# Patient Record
Sex: FEMALE | URBAN - METROPOLITAN AREA
[De-identification: names, ages, dates, MRNs, and addresses within clinical notes are randomized per-mention and may not be internally consistent; named-entity substitution may affect disease eponyms.]

---

## 2017-11-14 ENCOUNTER — IMPORTED ENCOUNTER (OUTPATIENT)
Dept: URBAN - METROPOLITAN AREA CLINIC 38 | Facility: CLINIC | Age: 52
End: 2017-11-14

## 2017-11-14 PROBLEM — H52.13 MYOPIA, BILATERAL: Noted: 2017-11-14

## 2017-11-14 PROCEDURE — 92015 DETERMINE REFRACTIVE STATE: CPT

## 2017-11-14 PROCEDURE — 92004 COMPRE OPH EXAM NEW PT 1/>: CPT

## 2018-05-05 ENCOUNTER — HOSPITAL ENCOUNTER (EMERGENCY)
Facility: HOSPITAL | Age: 53
Discharge: HOME | End: 2018-05-05
Attending: EMERGENCY MEDICINE
Payer: COMMERCIAL

## 2018-05-05 VITALS
OXYGEN SATURATION: 96 % | TEMPERATURE: 97.5 F | DIASTOLIC BLOOD PRESSURE: 74 MMHG | HEART RATE: 62 BPM | RESPIRATION RATE: 20 BRPM | SYSTOLIC BLOOD PRESSURE: 131 MMHG

## 2018-05-05 DIAGNOSIS — R07.9 CHEST PAIN, UNSPECIFIED TYPE: Primary | ICD-10-CM

## 2018-05-05 LAB
ANION GAP SERPL CALC-SCNC: 6 MEQ/L (ref 3–15)
BASOPHILS # BLD: 0.03 K/UL (ref 0.01–0.1)
BASOPHILS NFR BLD: 0.6 %
BUN SERPL-MCNC: 19 MG/DL (ref 8–20)
CALCIUM SERPL-MCNC: 9.2 MG/DL (ref 8.9–10.3)
CHLORIDE SERPL-SCNC: 106 MMOL/L (ref 98–109)
CO2 SERPL-SCNC: 26 MMOL/L (ref 22–32)
CREAT SERPL-MCNC: 0.7 MG/DL (ref 0.6–1.1)
D DIMER PPP IA.FEU-MCNC: <0.27 ÆG/ML (ref 0–0.5)
DIFFERENTIAL METHOD BLD: NORMAL
EOSINOPHIL # BLD: 0.11 K/UL (ref 0.04–0.36)
EOSINOPHIL NFR BLD: 2.2 %
ERYTHROCYTE [DISTWIDTH] IN BLOOD BY AUTOMATED COUNT: 12.2 % (ref 11.7–14.4)
GFR SERPL CREATININE-BSD FRML MDRD: >60 ML/MIN/1.73M*2
GLUCOSE SERPL-MCNC: 84 MG/DL (ref 70–99)
HCG UR QL: NEGATIVE
HCT VFR BLDCO AUTO: 38.9 % (ref 35–45)
HGB BLD-MCNC: 13.2 G/DL (ref 11.8–15.7)
IMM GRANULOCYTES # BLD AUTO: 0.01 K/UL (ref 0–0.08)
IMM GRANULOCYTES NFR BLD AUTO: 0.2 %
LYMPHOCYTES # BLD: 1.88 K/UL (ref 1.2–3.5)
LYMPHOCYTES NFR BLD: 37.5 %
MCH RBC QN AUTO: 30.1 PG (ref 28–33.2)
MCHC RBC AUTO-ENTMCNC: 33.9 G/DL (ref 32.2–35.5)
MCV RBC AUTO: 88.8 FL (ref 83–98)
MONOCYTES # BLD: 0.41 K/UL (ref 0.28–0.8)
MONOCYTES NFR BLD: 8.2 %
NEUTROPHILS # BLD: 2.58 K/UL (ref 1.7–7)
NEUTS SEG NFR BLD: 51.3 %
NRBC BLD-RTO: 0 %
PDW BLD AUTO: 10.8 FL (ref 9.4–12.3)
PLATELET # BLD AUTO: 178 K/UL (ref 150–369)
POTASSIUM SERPL-SCNC: 4 MMOL/L (ref 3.6–5.1)
RBC # BLD AUTO: 4.38 M/UL (ref 3.93–5.22)
SODIUM SERPL-SCNC: 138 MMOL/L (ref 136–144)
TROPONIN I SERPL-MCNC: <0.02 NG/ML
TROPONIN I SERPL-MCNC: <0.02 NG/ML
WBC # BLD AUTO: 5.02 K/UL (ref 3.8–10.5)

## 2018-05-05 PROCEDURE — 84484 ASSAY OF TROPONIN QUANT: CPT | Mod: 91 | Performed by: PHYSICIAN ASSISTANT

## 2018-05-05 PROCEDURE — 85025 COMPLETE CBC W/AUTO DIFF WBC: CPT | Performed by: PHYSICIAN ASSISTANT

## 2018-05-05 PROCEDURE — 99284 EMERGENCY DEPT VISIT MOD MDM: CPT | Mod: 25

## 2018-05-05 PROCEDURE — 63700000 HC SELF-ADMINISTRABLE DRUG: Performed by: EMERGENCY MEDICINE

## 2018-05-05 PROCEDURE — 82374 ASSAY BLOOD CARBON DIOXIDE: CPT | Performed by: PHYSICIAN ASSISTANT

## 2018-05-05 PROCEDURE — 85379 FIBRIN DEGRADATION QUANT: CPT | Performed by: PHYSICIAN ASSISTANT

## 2018-05-05 PROCEDURE — 96360 HYDRATION IV INFUSION INIT: CPT

## 2018-05-05 PROCEDURE — 84703 CHORIONIC GONADOTROPIN ASSAY: CPT | Performed by: PHYSICIAN ASSISTANT

## 2018-05-05 PROCEDURE — 93005 ELECTROCARDIOGRAM TRACING: CPT | Performed by: PHYSICIAN ASSISTANT

## 2018-05-05 PROCEDURE — 25800000 HC PHARMACY IV SOLUTIONS: Performed by: PHYSICIAN ASSISTANT

## 2018-05-05 PROCEDURE — 93005 ELECTROCARDIOGRAM TRACING: CPT | Performed by: EMERGENCY MEDICINE

## 2018-05-05 PROCEDURE — 36415 COLL VENOUS BLD VENIPUNCTURE: CPT | Performed by: PHYSICIAN ASSISTANT

## 2018-05-05 RX ORDER — IBUPROFEN 600 MG/1
600 TABLET ORAL ONCE
Status: COMPLETED | OUTPATIENT
Start: 2018-05-05 | End: 2018-05-05

## 2018-05-05 RX ORDER — ASPIRIN 325 MG
325 TABLET ORAL DAILY
COMMUNITY
End: 2021-10-25

## 2018-05-05 RX ADMIN — SODIUM CHLORIDE 1000 ML: 9 INJECTION, SOLUTION INTRAVENOUS at 14:37

## 2018-05-05 RX ADMIN — IBUPROFEN 600 MG: 600 TABLET ORAL at 17:28

## 2018-05-05 NOTE — ED ATTESTATION NOTE
I have personally seen and examined the patient.  I reviewed and agree with physician assistant / nurse practitioner’s assessment and plan of care, with the following exceptions: None  My examination, assessment, and plan of care of Katie Mcdonald is as follows:     Exam: Well-appearing, no acute distress, regular rate and rhythm, lungs clear to auscultation, normal pulses, no lower extremity edema or calf tenderness to palpation.    Assessment and plan: Patient with constant pleuritic chest pain since 5 AM, labs, EKG, chest x-ray unremarkable, will repeat troponin and if negative, follow-up Monday with cards.     Mumtaz Rasmussen, DO  05/05/18 [id number]

## 2018-05-05 NOTE — DISCHARGE INSTRUCTIONS
Follow up with a cardiologist on Monday morning  Follow up with your PCP   Return to the ED if symptoms worsen

## 2018-05-06 LAB
ATRIAL RATE: 54
ATRIAL RATE: 72
P AXIS: 69
P AXIS: 73
PR INTERVAL: 140
PR INTERVAL: 156
QRS DURATION: 90
QRS DURATION: 94
QT INTERVAL: 408
QT INTERVAL: 410
QTC CALCULATION(BAZETT): 386
QTC CALCULATION(BAZETT): 448
R AXIS: 31
R AXIS: 31
T WAVE AXIS: 30
T WAVE AXIS: 42
VENTRICULAR RATE: 54
VENTRICULAR RATE: 72

## 2018-10-24 ENCOUNTER — IMPORTED ENCOUNTER (OUTPATIENT)
Dept: URBAN - METROPOLITAN AREA CLINIC 38 | Facility: CLINIC | Age: 53
End: 2018-10-24

## 2018-10-24 PROBLEM — H53.9 VISUAL DISTURBANCE - UNSPECIFIED: Noted: 2018-10-24

## 2018-10-24 PROBLEM — H52.13 MYOPIA, BILATERAL: Noted: 2018-10-24

## 2018-10-24 PROCEDURE — 92015 DETERMINE REFRACTIVE STATE: CPT

## 2018-11-28 ENCOUNTER — IMPORTED ENCOUNTER (OUTPATIENT)
Dept: URBAN - METROPOLITAN AREA CLINIC 38 | Facility: CLINIC | Age: 53
End: 2018-11-28

## 2018-11-28 PROBLEM — H43.812 VITREOUS DETACHMENT OF LT EYE: Noted: 2018-11-28

## 2018-11-28 PROBLEM — H53.9 VISUAL DISTURBANCE - UNSPECIFIED: Noted: 2018-11-28

## 2018-11-28 PROCEDURE — 92014 COMPRE OPH EXAM EST PT 1/>: CPT

## 2019-10-23 ENCOUNTER — IMPORTED ENCOUNTER (OUTPATIENT)
Dept: URBAN - METROPOLITAN AREA CLINIC 38 | Facility: CLINIC | Age: 54
End: 2019-10-23

## 2019-10-23 PROBLEM — H52.13 MYOPIA, BILATERAL: Noted: 2019-10-23

## 2019-10-23 PROBLEM — H25.13 CATARACT (AGE RELATED) - NS (NUCLEAR) - OU: Noted: 2019-10-23

## 2019-10-23 PROCEDURE — 92015 DETERMINE REFRACTIVE STATE: CPT

## 2021-10-25 ENCOUNTER — OFFICE VISIT (OUTPATIENT)
Dept: CARDIOLOGY | Facility: CLINIC | Age: 56
End: 2021-10-25
Payer: COMMERCIAL

## 2021-10-25 ENCOUNTER — TELEPHONE (OUTPATIENT)
Dept: CARDIOLOGY | Facility: CLINIC | Age: 56
End: 2021-10-25

## 2021-10-25 VITALS
WEIGHT: 153 LBS | SYSTOLIC BLOOD PRESSURE: 120 MMHG | HEIGHT: 66 IN | HEART RATE: 71 BPM | DIASTOLIC BLOOD PRESSURE: 78 MMHG | BODY MASS INDEX: 24.59 KG/M2 | OXYGEN SATURATION: 98 %

## 2021-10-25 DIAGNOSIS — R07.9 CHEST PAIN, UNSPECIFIED TYPE: Primary | ICD-10-CM

## 2021-10-25 DIAGNOSIS — R06.02 SHORTNESS OF BREATH: ICD-10-CM

## 2021-10-25 DIAGNOSIS — I34.0 NONRHEUMATIC MITRAL VALVE REGURGITATION: ICD-10-CM

## 2021-10-25 PROCEDURE — 93000 ELECTROCARDIOGRAM COMPLETE: CPT | Performed by: INTERNAL MEDICINE

## 2021-10-25 PROCEDURE — 3008F BODY MASS INDEX DOCD: CPT | Performed by: INTERNAL MEDICINE

## 2021-10-25 PROCEDURE — 99214 OFFICE O/P EST MOD 30 MIN: CPT | Performed by: INTERNAL MEDICINE

## 2021-10-25 NOTE — TELEPHONE ENCOUNTER
Name of patient: Katie Mcdonald, [birth year]         Name of physician: MOISÉS Vidal         Type of test: TTE         Insurance: [facility]         Location having test done: [facility]         NPI of location: [id number]         Scheduled/Expected date for test: 11/16/21

## 2021-10-25 NOTE — PROGRESS NOTES
"   [facility] Cardiology  [place] Office Initial Visit       Patient ID: Katie Mcdonald 56 y.o. female 1965  PCP: Maria Miles MD      HPI     Katie Mcdonald is a 56 y.o. female .    She has a background of atypical chest pain in 2018 that necessitated a visit to the [facility] emergency room. She is a family history of premature atherosclerotic heart disease in her mother. She had a stress echo performed in our office in May 2018 which revealed good exercise tolerance PACs with exercise but no electrocardiographic or echocardiographic ischemia. Her resting left ventricular function was normal at that time as well.    She complains of nonspecific symptoms in her upper chest and neck that are not necessarily exertional.  It does happen sometimes when she is gardening but it involves bending over and standing up a lot with inadvertent Valsalva maneuvers.  She denies symptoms suggestive of angina.       Past History      Past Medical History:   Diagnosis Date   • Leaky heart valve 2018   • Shortness of breath        History reviewed. No pertinent surgical history.    Social History     Social History Narrative   • Not on file       Family History   Problem Relation Age of Onset   • Hypertension Biological Mother    • Hypertension Biological Father         Medications     Current Outpatient Medications   Medication Instructions   • aspirin 325 mg, oral, Daily         Allergies     Antihistimine and Super bees     Vital Signs/Exam     Vitals:    10/25/21 1039   BP: 120/78   Pulse: 71   SpO2: 98%   Weight: 69.4 kg (153 lb)   Height: 1.676 m (5' 6\")     BP Readings from Last 3 Encounters:   10/25/21 120/78   05/05/18 131/74     Wt Readings from Last 3 Encounters:   10/25/21 69.4 kg (153 lb)        Gen: no distress  HEENT: no jvd  Lungs: clear bilaterally; no crackles, rhonchi, wheezing  Heart: regular rate and rhythm; no murmur  Extremities: no edema  Neuro: nonfocal, alert and " oriented  Psych: normal mood and affect     Diagnostic Data   Diagnostic data personally reviewed. Available medical records were reviewed and updated where appropriate including laboratory data, imaging studies, and previous outpatient and inpatient records.  Counseling/education performed.     Labs:  Lab Results   Component Value Date    WBC 5.02 05/05/2018    HGB 13.2 05/05/2018     05/05/2018     05/05/2018    K 4.0 05/05/2018     05/05/2018    CREATININE 0.7 05/05/2018    BUN 19 05/05/2018    CO2 26 05/05/2018     No results found for: CHOL, HDL  No results found for: LDLCALC    Echocardiogram:      Stress Tests:             Vascular Studies:  No results found for this or any previous visit from the past 365 days.    No results found for this or any previous visit from the past 365 days.    No results found for this or any previous visit from the past 365 days.    No results found for this or any previous visit from the past 365 days.      Cardiac cath:      Peripheral:  No results found for this or any previous visit from the past 365 days.         Assessment and Plan      Katie Mcdonald is a 56 y.o. female who presents today for initial office evaluation.       Diagnosis Plan   1. Chest pain, unspecified type     2. Nonrheumatic mitral valve regurgitation     3. Shortness of breath  ECG 12 LEAD-OFFICE PERFORMED       With regards to her symptoms while gardening I think they are nonspecific and noncardiac.  She has mild mitral insufficiency which is nonrheumatic and I suspect is almost physiologic but has been 3 years since her last echocardiogram and I am going to repeat it.  In the meantime there are certainly no restrictions on her activities.  I do not see the need to repeat her stress test.             James Vidal M.D., F.A.C.C.      [facility] Office: [phone]  [facility]: [facility]     10/25/2021

## 2021-11-16 ENCOUNTER — HOSPITAL ENCOUNTER (OUTPATIENT)
Dept: CARDIOLOGY | Facility: CLINIC | Age: 56
Discharge: HOME | End: 2021-11-16
Payer: COMMERCIAL

## 2021-11-16 VITALS — BODY MASS INDEX: 24.59 KG/M2 | HEIGHT: 66 IN | WEIGHT: 153 LBS

## 2021-11-16 DIAGNOSIS — I34.0 NONRHEUMATIC MITRAL VALVE REGURGITATION: ICD-10-CM

## 2021-11-16 DIAGNOSIS — R06.02 SHORTNESS OF BREATH: ICD-10-CM

## 2021-11-16 DIAGNOSIS — R07.9 CHEST PAIN, UNSPECIFIED TYPE: ICD-10-CM

## 2021-11-16 LAB
AORTIC ROOT ANNULUS: 2.9 CM
AORTIC VALVE AREA: 1.22 SQUARE CENTIMETERS PER SQUARE METER
ASCENDING AORTA: 2.73 CM
AV PEAK GRADIENT: 6.1 MMHG
AV PEAK VELOCITY-S: 1.23 M/S
BSA FOR ECHO PROCEDURE: 1.8 M2
E WAVE DECELERATION TIME: 133.26 MS
E/A RATIO: 1.09
E/E' RATIO: 5.81
E/LAT E' RATIO: 5.36
EDV (BP): 56.03 ML
EF (A4C): 62.45 %
EF A2C: 70.22 %
EJECTION FRACTION: 67.71 %
EST RIGHT VENT SYSTOLIC PRESSURE BY TRICUSPID REGURGITATION JET: 30 MMHG
ESV (BP): 18.09 ML
INTERVENTRICULAR SEPTUM: 1.09 CM
LA ESV (BP): 47.65 ML
LA ESV INDEX (A2C): 27.78 ML/M2
LA ESV INDEX (BP): 26.47 ML/M2
LAAS-AP2: 17.7 CM2
LAAS-AP4: 16.38 CM2
LAV-S: 50 ML
LEFT ATRIUM VOLUME INDEX: 23.67 ML/M2
LEFT ATRIUM VOLUME: 42.6 ML
LEFT INTERNAL DIMENSION IN SYSTOLE: 3.23 CM (ref 2.47–3.74)
LEFT VENTRICLE DIASTOLIC VOLUME INDEX: 27.67 ML/M2
LEFT VENTRICLE DIASTOLIC VOLUME: 49.8 ML
LEFT VENTRICLE SYSTOLIC VOLUME INDEX: 10.39 ML/M2
LEFT VENTRICLE SYSTOLIC VOLUME: 18.7 ML
LEFT VENTRICULAR INTERNAL DIMENSION IN DIASTOLE: 4.11 CM (ref 4.18–5.8)
LEFT VENTRICULAR POSTERIOR WALL IN END DIASTOLE: 1.05 CM (ref 0.55–1.02)
LV DIASTOLIC VOLUME: 54.9 ML
LV ESV (APICAL 2 CHAMBER): 16.35 ML
LVCI: 1.36 LITERS PER MINUTE PER SQUARE METER
LVCO: 2.43 LITERS PER MINUTE
LVEDVI(A2C): 30.5 ML/M2
LVEDVI(BP): 31.13 ML/M2
LVESVI(A2C): 9.08 ML/M2
LVESVI(BP): 10.05 ML/M2
LVOT 2D: 1.91 CM
LVOT A: 2.88 CM2
LVOT PEAK VELOCITY: 0.93 M/S
MV E'TISSUE VEL-LAT: 0.13 M/S
MV E'TISSUE VEL-MED: 0.12 M/S
MV PEAK A VEL: 0.62 M/S
MV PEAK E VEL: 0.68 M/S
MV VALVE AREA P 1/2 METHOD: 5.69 CM2
POSTERIOR WALL: 1 CM
RAP: 10 MMHG
SEPTAL TISSUE DOPPLER FREE WALL LATE DIA VELOCITY (APICAL 4 CHAMBER VIEW): 0.12 M/S
TR MAX PG: 20 MMHG
TRICUSPID VALVE PEAK REGURGITATION VELOCITY: 2.25 M/S
Z-SCORE OF LEFT VENTRICULAR DIMENSION IN END DIASTOLE: -1.8
Z-SCORE OF LEFT VENTRICULAR DIMENSION IN END SYSTOLE: 0.47
Z-SCORE OF LEFT VENTRICULAR POSTERIOR WALL IN END DIASTOLE: 1.78

## 2021-11-16 PROCEDURE — 93306 TTE W/DOPPLER COMPLETE: CPT | Performed by: INTERNAL MEDICINE

## 2022-06-25 ASSESSMENT — VISUAL ACUITY
OD_CC: 20/20
OS_SC: <J7
OD_SC: <J7
OS_CC: 20/20
OS_SC: 20/25
OD_CC: 20/20
OD_SC: J3
OS_SC: J4
OS_SC: J4
OD_SC: J5
OD_CC: 20/20
OD_SC: J2
OS_CC: 20/20
OS_CC: 20/20
OD_SC: 20/40+1
OS_CC: 20/20
OD_CC: 20/20
OS_SC: J3

## 2022-06-25 ASSESSMENT — TONOMETRY
OS_IOP_MMHG: 18
OS_IOP_MMHG: 16
OD_IOP_MMHG: 16
OD_IOP_MMHG: 22
OS_IOP_MMHG: 23
OD_IOP_MMHG: 19
OD_IOP_MMHG: 15
OS_IOP_MMHG: 22

## 2022-06-25 ASSESSMENT — KERATOMETRY
OS_AXISANGLE2_DEGREES: 80
OD_AXISANGLE_DEGREES: 5
OD_AXISANGLE2_DEGREES: 95
OS_K2POWER_DIOPTERS: 40.75
OD_K1POWER_DIOPTERS: 40.50
OS_K1POWER_DIOPTERS: 40.00
OD_K2POWER_DIOPTERS: 41.00
OS_K1POWER_DIOPTERS: 40.00
OD_K2POWER_DIOPTERS: 41.00
OD_AXISANGLE2_DEGREES: 95
OS_AXISANGLE_DEGREES: 170
OD_AXISANGLE_DEGREES: 5
OS_K1POWER_DIOPTERS: 40.25
OD_AXISANGLE2_DEGREES: 95
OD_K2POWER_DIOPTERS: 41.00
OS_AXISANGLE_DEGREES: 170
OS_K2POWER_DIOPTERS: 41.00
OD_K1POWER_DIOPTERS: 40.25
OS_AXISANGLE2_DEGREES: 85
OS_K2POWER_DIOPTERS: 40.75
OD_K1POWER_DIOPTERS: 40.50
OS_AXISANGLE_DEGREES: 175
OD_AXISANGLE_DEGREES: 5
OS_AXISANGLE2_DEGREES: 80